# Patient Record
Sex: FEMALE | Race: OTHER | HISPANIC OR LATINO | Employment: FULL TIME | URBAN - METROPOLITAN AREA
[De-identification: names, ages, dates, MRNs, and addresses within clinical notes are randomized per-mention and may not be internally consistent; named-entity substitution may affect disease eponyms.]

---

## 2020-12-02 ENCOUNTER — OFFICE VISIT (OUTPATIENT)
Dept: URGENT CARE | Age: 25
End: 2020-12-02
Payer: COMMERCIAL

## 2020-12-02 VITALS — HEIGHT: 62 IN | BODY MASS INDEX: 26.68 KG/M2 | WEIGHT: 145 LBS

## 2020-12-02 DIAGNOSIS — R05.9 COUGH: Primary | ICD-10-CM

## 2020-12-02 PROCEDURE — U0003 INFECTIOUS AGENT DETECTION BY NUCLEIC ACID (DNA OR RNA); SEVERE ACUTE RESPIRATORY SYNDROME CORONAVIRUS 2 (SARS-COV-2) (CORONAVIRUS DISEASE [COVID-19]), AMPLIFIED PROBE TECHNIQUE, MAKING USE OF HIGH THROUGHPUT TECHNOLOGIES AS DESCRIBED BY CMS-2020-01-R: HCPCS | Performed by: PHYSICIAN ASSISTANT

## 2020-12-02 PROCEDURE — 99203 OFFICE O/P NEW LOW 30 MIN: CPT | Performed by: PHYSICIAN ASSISTANT

## 2020-12-04 LAB — SARS-COV-2 RNA SPEC QL NAA+PROBE: NOT DETECTED

## 2022-03-14 ENCOUNTER — OFFICE VISIT (OUTPATIENT)
Dept: URGENT CARE | Age: 27
End: 2022-03-14
Payer: COMMERCIAL

## 2022-03-14 VITALS
HEIGHT: 62 IN | DIASTOLIC BLOOD PRESSURE: 65 MMHG | OXYGEN SATURATION: 97 % | WEIGHT: 153 LBS | BODY MASS INDEX: 28.16 KG/M2 | TEMPERATURE: 97.9 F | HEART RATE: 98 BPM | SYSTOLIC BLOOD PRESSURE: 134 MMHG | RESPIRATION RATE: 20 BRPM

## 2022-03-14 DIAGNOSIS — M26.609 TMJ (TEMPOROMANDIBULAR JOINT SYNDROME): Primary | ICD-10-CM

## 2022-03-14 PROCEDURE — 99214 OFFICE O/P EST MOD 30 MIN: CPT | Performed by: FAMILY MEDICINE

## 2022-03-14 RX ORDER — DICLOFENAC SODIUM 75 MG/1
75 TABLET, DELAYED RELEASE ORAL 2 TIMES DAILY
Qty: 60 TABLET | Refills: 0 | Status: SHIPPED | OUTPATIENT
Start: 2022-03-14

## 2022-03-14 NOTE — LETTER
To whom it may concern,      Matt Botello was seen in my office on 03/14/22  She may return to work today  Thank you!       Sincerely,    Leandra Jolley, DO

## 2022-03-14 NOTE — PATIENT INSTRUCTIONS
Temporomandibular Disorder   WHAT YOU NEED TO KNOW:   What is temporomandibular disorder? Temporomandibular disorder is a condition that causes pain in your jaw  The disorder affects the joint between your temporal bone and your mandible (jawbone)  The muscles and nerves around the joint are also affected  What causes temporomandibular disorder? · Dislocation of the cartilage disc in the joint    · Deformities of the jaw    · Inflammation, infection, arthritis, muscle problems, or tumors in the jaw area     · Injury to or fracture of the jawbone    · Muscle strain from chewing or teeth clenching or grinding    What are the signs and symptoms of temporomandibular disorder? · Popping or grating sound when you open or close your mouth    · Headache or pain in your jaw, ear, neck, or face    · Pain or swelling of the jaw muscles    · Tingling or numbness in the jaw or face    · Trouble opening or closing your mouth, or your jaw locks    How is temporomandibular disorder diagnosed? Your healthcare provider will examine your jaw, face, and neck  He will ask you about your health conditions or injuries  You may also need the following tests:  · X-rays: You may need to have x-rays of your skull, jaw, or teeth  · Arthrogram:  This is an x-ray that uses contrast dye to help the pictures show up better  Tell the healthcare provider if you have ever had an allergic reaction to contrast dye  · MRI:  This scan uses powerful magnets and a computer to take pictures of your jaw  You may be given contrast dye to help the pictures show up better  Tell the healthcare provider if you have ever had an allergic reaction to contrast dye  Do not enter the MRI room with anything metal  Metal can cause serious injury  Tell the healthcare provider if you have any metal in or on your body  · Bone scan: This is a test done to look at the bones in your body   The bone scan shows areas where your bone is diseased or damaged  You will get a radioactive liquid, called a tracer, through a vein in your arm  The tracer collects in your bones  Pictures will then be taken to look for problems  Examples of bone problems include fractures (breaks) and infection  How is temporomandibular disorder treated? · Medicines:      ? NSAIDs:  These medicines decrease swelling and pain  You can buy NSAIDs without a doctor's order  Ask your healthcare provider which medicine is right for you, and how much to take  Take as directed  NSAIDs can cause stomach bleeding or kidney problems if not taken correctly  ? Botulinum toxin:  This may be injected into the muscles of your jaw to decrease pain  ? Steroid medicine: These may be injected into the joint to decrease pain and swelling  ? Muscle relaxers  help decrease pain and muscle spasms  · Surgery: You may need surgery to fix your teeth, jawbone, or the joint  What are the risks of temporomandibular disorder? You may bleed or get an infection if you have surgery  If left untreated, your condition may get worse  You may have trouble breathing, eating, drinking, talking, or opening your mouth  If not treated early, temporomandibular disorder may lead to permanent injury, such as nerve damage, deformity, or paralysis  How can I manage my symptoms? · Eat soft foods: Your healthcare provider may suggest that you eat only soft foods for several days  A dietitian may work with you to find foods that are easier to bite, chew, or swallow  Examples are soup, applesauce, cottage cheese, pudding, yogurt, and soft fruits  · Use jaw supporting devices:  Splints may be used to support your jaw or keep it from moving  You may need to wear a mouth guard to keep you from clenching or grinding your teeth while you are sleeping  · Use ice and heat:  Ice helps decrease swelling and pain  Ice may also help prevent tissue damage  Use an ice pack, or put crushed ice in a plastic bag  Cover it with a towel and place it on your jaw for 15 to 20 minutes every hour or as directed  After the first 24 to 48 hours, use heat to decrease pain, swelling, and muscle spasms  Apply heat on the area for 20 to 30 minutes every 2 hours for as many days as directed  · Go to physical therapy:  A physical therapist teaches you exercises to help improve movement and strength, and to decrease pain in your jaw  A speech therapist may help you with swallowing and speech exercises  When should I contact my healthcare provider? · You have a fever  · Your splint or mouth guard is loose  · You have questions or concerns about your condition or care  When should I seek immediate care or call 911? · You have nausea, are vomiting, or cannot keep liquids down  · You have pain that does not go away even after you take your pain medicine  · You have problems breathing, talking, drinking, eating, or swallowing  · Your splint or mouth guard gets damaged or broken  CARE AGREEMENT:   You have the right to help plan your care  Learn about your health condition and how it may be treated  Discuss treatment options with your healthcare providers to decide what care you want to receive  You always have the right to refuse treatment  The above information is an  only  It is not intended as medical advice for individual conditions or treatments  Talk to your doctor, nurse or pharmacist before following any medical regimen to see if it is safe and effective for you  © Copyright [employer] 2022 Information is for End User's use only and may not be sold, redistributed or otherwise used for commercial purposes   All illustrations and images included in CareNotes® are the copyrighted property of [employer]  or [address] [place] [address] [employer]

## 2022-03-14 NOTE — PROGRESS NOTES
[facility]        NAME: Truong Servin is a 33 y o  female  : 1995    MRN: [id number]  DATE: 2022  TIME: 3:01 PM    Assessment and Plan   TMJ (temporomandibular joint syndrome) [M26 609]  1  TMJ (temporomandibular joint syndrome)  diclofenac (VOLTAREN) 75 mg EC tablet         Patient Instructions     NSAIDs given for pain  Recommend seeing a dentist if pain continues  Decreasing stress levels will also be helpful  Follow up with PCP in 3-5 days if no improvement  Proceed to  ER if symptoms worsen  Chief Complaint     Chief Complaint   Patient presents with    Jaw Pain     Pt states she started yesterday with left sided pain below her ear  Today states she heard a "snapping" sound when biting down on a banana  No OTC meds taken  History of Present Illness       33year old female presents today complaining of left sided jaw pain  She states that she feels snapping when eating  She denies any radiation of pain  She notes increases in her stress levels  She has no tooth pain or sensitivity to hot/cold foods  She has tried ibuprofen with little relief  Review of Systems   Review of Systems   Constitutional: Negative for chills, fatigue and fever  HENT: Negative for postnasal drip and sore throat  Respiratory: Negative for cough and shortness of breath  Cardiovascular: Negative for chest pain and palpitations  Gastrointestinal: Negative for abdominal pain, nausea and vomiting  Genitourinary: Negative for dysuria  Musculoskeletal: Negative for gait problem and joint swelling  Skin: Negative for rash  Neurological: Negative for dizziness, syncope, light-headedness, numbness and headaches  Psychiatric/Behavioral: Negative for agitation and confusion  All other systems reviewed and are negative          Current Medications       Current Outpatient Medications:     diclofenac (VOLTAREN) 75 mg EC tablet, Take 1 tablet (75 mg total) by mouth 2 (two) times a day, Disp: 60 tablet, Rfl: 0    norethindrone-ethinyl estradiol-iron (ESTROSTEP FE) 1-20/1-30/1-35 MG-MCG TABS, Take by mouth daily, Disp: , Rfl:     Current Allergies     Allergies as of 03/14/2022 - Reviewed 03/14/2022   Allergen Reaction Noted    Lactose - food allergy  12/02/2020            The following portions of the patient's history were reviewed and updated as appropriate: allergies, current medications, past family history, past medical history, past social history, past surgical history and problem list      Past Medical History:   Diagnosis Date    COVID        Past Surgical History:   Procedure Laterality Date    BUNIONECTOMY         History reviewed  No pertinent family history  Medications have been verified  Objective   /65   Pulse 98   Temp 97 9 °F (36 6 °C)   Resp 20   Ht 5' 2" (1 575 m)   Wt 69 4 kg (153 lb)   LMP 02/14/2022 (Approximate)   SpO2 97%   BMI 27 98 kg/m²   Patient's last menstrual period was 02/14/2022 (approximate)  Physical Exam     Physical Exam  Vitals reviewed  Constitutional:       General: She is not in acute distress  Appearance: Normal appearance  She is not ill-appearing  HENT:      Head: Normocephalic and atraumatic  Jaw: Tenderness and pain on movement present  No swelling  Mouth/Throat:      Mouth: Mucous membranes are moist       Dentition: No dental caries  Tongue: No lesions  Eyes:      Extraocular Movements: Extraocular movements intact  Conjunctiva/sclera: Conjunctivae normal    Musculoskeletal:         General: Tenderness present  No signs of injury  Cervical back: Normal range of motion  Skin:     General: Skin is warm  Neurological:      General: No focal deficit present  Mental Status: She is alert     Psychiatric:         Mood and Affect: Mood normal          Behavior: Behavior normal          Judgment: Judgment normal

## 2023-06-18 ENCOUNTER — OFFICE VISIT (OUTPATIENT)
Dept: URGENT CARE | Age: 28
End: 2023-06-18
Payer: COMMERCIAL

## 2023-06-18 VITALS
HEART RATE: 87 BPM | RESPIRATION RATE: 18 BRPM | DIASTOLIC BLOOD PRESSURE: 86 MMHG | TEMPERATURE: 97.7 F | SYSTOLIC BLOOD PRESSURE: 134 MMHG | OXYGEN SATURATION: 98 % | HEIGHT: 62 IN | WEIGHT: 152 LBS | BODY MASS INDEX: 27.97 KG/M2

## 2023-06-18 DIAGNOSIS — B35.4 TINEA CORPORIS: Primary | ICD-10-CM

## 2023-06-18 PROCEDURE — G0382 LEV 3 HOSP TYPE B ED VISIT: HCPCS | Performed by: FAMILY MEDICINE

## 2023-06-18 NOTE — PROGRESS NOTES
[facility]        NAME: Christian Kennedy is a 33 y o  female  : 1995    MRN: [id number]  DATE: 2023  TIME: 8:42 AM    Assessment and Plan   Tinea corporis [B35 4]  1  Tinea corporis  econazole nitrate 1 % cream            Patient Instructions       Follow up with PCP in 3-5 days  Proceed to  ER if symptoms worsen  Chief Complaint     Chief Complaint   Patient presents with   • Rash     Rash x 2 weeks         History of Present Illness       27-year-old female here today with complaints of a rash that is appeared left upper arm over the past 2 weeks  It is slightly pruritic raised  It is also  Above her right breast and right anterior axillary area  She has tried over-the-counter Lotrimin for several days with no significant improvement  She is unsure but believes there is another cluster of rash anterior neck area which is slightly pruritic  Describes a history of ringworm in the past treated successfully  Review of Systems   Review of Systems   Skin: Positive for rash           Current Medications       Current Outpatient Medications:   •  econazole nitrate 1 % cream, Apply topically 2 (two) times a day Apply topically to affected area twice a day for up to 2 weeks then as needed, Disp: 30 g, Rfl: 0  •  norethindrone-ethinyl estradiol-iron (ESTROSTEP FE) 1-20/1-30/1-35 MG-MCG TABS, Take by mouth daily, Disp: , Rfl:   •  diclofenac (VOLTAREN) 75 mg EC tablet, Take 1 tablet (75 mg total) by mouth 2 (two) times a day (Patient not taking: Reported on 2023), Disp: 60 tablet, Rfl: 0    Current Allergies     Allergies as of 2023 - Reviewed 2023   Allergen Reaction Noted   • Lactose - food allergy  2020            The following portions of the patient's history were reviewed and updated as appropriate: allergies, current medications, past family history, past medical history, past social history, past surgical history and problem list      Past Medical History: "  Diagnosis Date   • COVID        Past Surgical History:   Procedure Laterality Date   • BUNIONECTOMY         History reviewed  No pertinent family history  Medications have been verified  Objective   /86   Pulse 87   Temp 97 7 °F (36 5 °C)   Resp 18   Ht 5' 2\" (1 575 m)   Wt 68 9 kg (152 lb)   SpO2 98%   BMI 27 80 kg/m²   No LMP recorded  Physical Exam     Physical Exam  Vitals and nursing note reviewed  Constitutional:       Appearance: Normal appearance  Skin:     Findings: Rash present  Comments: Left forearm flexor aspect reveals macular lesion measuring about 1 cm with elevated borders, slightly hyperkeratotic  Similar rash is also noted above the right breast approximate 1 cm in diameter  Third rash located right anterior axillary area measuring about 1 5 cm in diameter similar borders  Slightly erythematous raised cluster hyper keratotic macular lesion of the anterior neck  All lesions are consistent with tinea corporis   Neurological:      Mental Status: She is alert                     "

## 2023-06-18 NOTE — PATIENT INSTRUCTIONS
I prescribed econazole cream 1% ,    Tinea Corporis   WHAT YOU NEED TO KNOW:   Tinea corporis, also called ringworm, is a skin infection caused by a fungus  It usually affects the skin on the face, chest, or limbs  Tinea corporis is most common in children and athletes  DISCHARGE INSTRUCTIONS:   Call your doctor if:   You have a fever  Your rash continues to spread after 7 days of treatment  Your rash is not gone within 2 weeks  The area around your sore becomes red, warm, tender, swollen, or smells bad  You have questions or concerns about your condition or care  Medicines:   Antifungal medicine  may be given as a cream or pill  Use the medicine until it is gone, even if it looks like your infection is gone sooner  Take your medicine as directed  Contact your healthcare provider if you think your medicine is not helping or if you have side effects  Tell your provider if you are allergic to any medicine  Keep a list of the medicines, vitamins, and herbs you take  Include the amounts, and when and why you take them  Bring the list or the pill bottles to follow-up visits  Carry your medicine list with you in case of an emergency  Prevent tinea corporis:   Wash all items that come into contact with infected skin  Wash all towels, clothes, and bedding in hot water  Use laundry soap  Clean shower stalls, mats, and floors with a germ-killing or fungus-killing   Do not share personal items  Examples include towels, brushes, starks, and hair accessories  Keep your skin, hair, and nails clean and dry  Dry your skin before you put medicine on the infected area  Wash your hands often  Do not scratch your sores  This may cause the infection to spread  Do not participate in contact sports, as directed  Talk to your provider before you participate in contact sports, such as wrestling  Your provider may tell you to wait for 72 hours after you start treatment      Have infected pets treated by a   A patch of missing fur is a sign of infection in a pet  Wear gloves and long sleeves if you handle an infected animal  Always wash your hands after handling the animal  Vacuum your home to remove infected fur or skin flakes  Disinfect surfaces and bedding that your animal comes into contact with  Follow up with your doctor as directed:  Write down your questions so you remember to ask them during your visits  © Copyright [employer] 2022 Information is for End User's use only and may not be sold, redistributed or otherwise used for commercial purposes  The above information is an  only  It is not intended as medical advice for individual conditions or treatments  Talk to your doctor, nurse or pharmacist before following any medical regimen to see if it is safe and effective for you  to be applied topically to affected area twice a day for 2 weeks then as needed

## 2025-06-13 ENCOUNTER — OFFICE VISIT (OUTPATIENT)
Dept: URGENT CARE | Facility: CLINIC | Age: 30
End: 2025-06-13
Payer: COMMERCIAL

## 2025-06-13 VITALS
SYSTOLIC BLOOD PRESSURE: 106 MMHG | TEMPERATURE: 96.8 F | BODY MASS INDEX: 27.6 KG/M2 | WEIGHT: 150 LBS | HEIGHT: 62 IN | DIASTOLIC BLOOD PRESSURE: 76 MMHG | HEART RATE: 79 BPM | OXYGEN SATURATION: 99 % | RESPIRATION RATE: 16 BRPM

## 2025-06-13 DIAGNOSIS — R31.9 URINARY TRACT INFECTION WITH HEMATURIA, SITE UNSPECIFIED: Primary | ICD-10-CM

## 2025-06-13 DIAGNOSIS — N39.0 URINARY TRACT INFECTION WITH HEMATURIA, SITE UNSPECIFIED: Primary | ICD-10-CM

## 2025-06-13 LAB
SL AMB  POCT GLUCOSE, UA: NEGATIVE
SL AMB LEUKOCYTE ESTERASE,UA: ABNORMAL
SL AMB POCT BILIRUBIN,UA: NEGATIVE
SL AMB POCT BLOOD,UA: ABNORMAL
SL AMB POCT CLARITY,UA: ABNORMAL
SL AMB POCT COLOR,UA: YELLOW
SL AMB POCT KETONES,UA: ABNORMAL
SL AMB POCT NITRITE,UA: NEGATIVE
SL AMB POCT PH,UA: 6.5
SL AMB POCT SPECIFIC GRAVITY,UA: 1.01
SL AMB POCT URINE HCG: NEGATIVE
SL AMB POCT URINE PROTEIN: ABNORMAL
SL AMB POCT UROBILINOGEN: 0.2

## 2025-06-13 PROCEDURE — S9083 URGENT CARE CENTER GLOBAL: HCPCS | Performed by: PHYSICIAN ASSISTANT

## 2025-06-13 PROCEDURE — 81025 URINE PREGNANCY TEST: CPT | Performed by: PHYSICIAN ASSISTANT

## 2025-06-13 PROCEDURE — G0382 LEV 3 HOSP TYPE B ED VISIT: HCPCS | Performed by: PHYSICIAN ASSISTANT

## 2025-06-13 PROCEDURE — 87077 CULTURE AEROBIC IDENTIFY: CPT | Performed by: PHYSICIAN ASSISTANT

## 2025-06-13 PROCEDURE — 81002 URINALYSIS NONAUTO W/O SCOPE: CPT | Performed by: PHYSICIAN ASSISTANT

## 2025-06-13 PROCEDURE — 87086 URINE CULTURE/COLONY COUNT: CPT | Performed by: PHYSICIAN ASSISTANT

## 2025-06-13 RX ORDER — NITROFURANTOIN 25; 75 MG/1; MG/1
100 CAPSULE ORAL 2 TIMES DAILY
Qty: 10 CAPSULE | Refills: 0 | Status: SHIPPED | OUTPATIENT
Start: 2025-06-13 | End: 2025-06-18

## 2025-06-13 NOTE — PATIENT INSTRUCTIONS
Take antibiotic as directed.    Push fluids.    If burning on urination, you may try over the counter Azo Urinary Pain Relief or comparable product.  Please note that this type of product may cause your urine to become bright orange and it may stain your undergarments if you leak.  Please wear protection as needed.      Contact your PCP if not improving over next 3-5 days to arrange follow up appointment for as soon as possible.    If you have recurrent urinary tract problems, please follow up with your PCP and / or urologist for further evaluation.      If you develop worsening symptoms with associated fever, back pain, abdominal pain, nausea with vomiting, please proceed to emergency room for further evaluation.    Urine will be sent for culture.  Those results take approximately 48 to 72 hours to return.  You may access those test results through [employer].    Please note:  If tests have been performed at [facility], our office will contact you with results if changes need to be made to the care plan discussed with you at the visit.  You can review your full results on [facility].

## 2025-06-13 NOTE — PROGRESS NOTES
[facility]    NAME: Anjali Jang is a 30 y.o. female  : 1995    MRN: [id number]  DATE: 2025  TIME: 2:47 PM    Assessment and Plan   Urinary tract infection with hematuria, site unspecified [N39.0, R31.9]  1. Urinary tract infection with hematuria, site unspecified  POCT urine HCG    POCT urine dip    nitrofurantoin (MACROBID) 100 mg capsule    Urine culture    Urine culture          Patient Instructions     Patient Instructions   Take antibiotic as directed.    Push fluids.    If burning on urination, you may try over the counter Azo Urinary Pain Relief or comparable product.  Please note that this type of product may cause your urine to become bright orange and it may stain your undergarments if you leak.  Please wear protection as needed.      Contact your PCP if not improving over next 3-5 days to arrange follow up appointment for as soon as possible.    If you have recurrent urinary tract problems, please follow up with your PCP and / or urologist for further evaluation.      If you develop worsening symptoms with associated fever, back pain, abdominal pain, nausea with vomiting, please proceed to emergency room for further evaluation.    Urine will be sent for culture.  Those results take approximately 48 to 72 hours to return.  You may access those test results through [facility].    Please note:  If tests have been performed at [facility], our office will contact you with results if changes need to be made to the care plan discussed with you at the visit.  You can review your full results on [facility].           Chief Complaint     Chief Complaint   Patient presents with    Possible UTI     Started 4 days ago. Frequency, pink tinge with wiping today, irritation with urination, pressure in pelvis with touch, denies back pain.        History of Present Illness   Anjali Jang presents to the clinic c/o  Problems with urinating.    Started: 4 days ago.  Associated signs  "and symptoms: Urinary frequency, discomfort with urination, pelvic pressure.  Did see a tinge of pink on toilet paper after wiping but nothing in the toilet.  Modifying factors: Pushing fluids.    Sexually active.  No concerns with partner.          Review of Systems   Review of Systems   Constitutional: Negative.    Respiratory: Negative.     Cardiovascular: Negative.    Gastrointestinal:  Positive for abdominal pain. Negative for nausea and vomiting.   Genitourinary:  Positive for difficulty urinating, dysuria, frequency, hematuria and urgency. Negative for flank pain.   Musculoskeletal:  Negative for back pain.       Current Medications   Long-Term Medications[1]    Current Allergies     Allergies as of 06/13/2025 - Reviewed 06/13/2025   Allergen Reaction Noted    Lactose - food allergy  12/02/2020          The following portions of the patient's history were reviewed and updated as appropriate: allergies, current medications, past family history, past medical history, past social history, past surgical history and problem list.  Past Medical History[2]  Past Surgical History[3]  Family History[4]    Objective   /76   Pulse 79   Temp (!) 96.8 °F (36 °C)   Resp 16   Ht 5' 2\" (1.575 m)   Wt 68 kg (150 lb)   LMP 05/09/2025 (Exact Date)   SpO2 99%   BMI 27.44 kg/m²   Patient's last menstrual period was 05/09/2025 (exact date).       Physical Exam     Physical Exam  Vitals and nursing note reviewed.   Constitutional:       General: She is not in acute distress.     Appearance: She is well-developed. She is not ill-appearing, toxic-appearing or diaphoretic.     Cardiovascular:      Rate and Rhythm: Normal rate and regular rhythm.      Heart sounds: Normal heart sounds. No murmur heard.     No friction rub. No gallop.   Pulmonary:      Effort: Pulmonary effort is normal. No respiratory distress.      Breath sounds: Normal breath sounds. No stridor. No wheezing, rhonchi or rales.   Abdominal:      " Palpations: Abdomen is soft.      Tenderness: There is abdominal tenderness. There is no right CVA tenderness, left CVA tenderness, guarding or rebound.      Comments: Suprapubic TTP     Skin:     General: Skin is warm and dry.     Neurological:      General: No focal deficit present.      Mental Status: She is alert and oriented to person, place, and time.     Psychiatric:         Mood and Affect: Mood normal.         Behavior: Behavior normal.                  [1]   Long-Term Medications   Medication Sig Dispense Refill    diclofenac (VOLTAREN) 75 mg EC tablet Take 1 tablet (75 mg total) by mouth 2 (two) times a day (Patient not taking: Reported on 6/13/2025) 60 tablet 0    econazole nitrate 1 % cream Apply topically 2 (two) times a day Apply topically to affected area twice a day for up to 2 weeks then as needed (Patient not taking: Reported on 6/13/2025) 30 g 0    norethindrone-ethinyl estradiol-iron (ESTROSTEP FE) 1-20/1-30/1-35 MG-MCG TABS Take by mouth daily (Patient not taking: Reported on 6/13/2025)     [2]   Past Medical History:  Diagnosis Date    COVID    [3]   Past Surgical History:  Procedure Laterality Date    BUNIONECTOMY     [4]   Family History  Problem Relation Name Age of Onset    No Known Problems Mother      No Known Problems Father

## 2025-06-15 LAB — BACTERIA UR CULT: ABNORMAL

## 2025-06-16 ENCOUNTER — RESULTS FOLLOW-UP (OUTPATIENT)
Dept: URGENT CARE | Facility: CLINIC | Age: 30
End: 2025-06-16